# Patient Record
Sex: MALE | Race: WHITE | NOT HISPANIC OR LATINO | Employment: FULL TIME | URBAN - METROPOLITAN AREA
[De-identification: names, ages, dates, MRNs, and addresses within clinical notes are randomized per-mention and may not be internally consistent; named-entity substitution may affect disease eponyms.]

---

## 2024-09-16 ENCOUNTER — OFFICE VISIT (OUTPATIENT)
Dept: URGENT CARE | Facility: URGENT CARE | Age: 34
End: 2024-09-16
Payer: COMMERCIAL

## 2024-09-16 VITALS
DIASTOLIC BLOOD PRESSURE: 95 MMHG | RESPIRATION RATE: 18 BRPM | WEIGHT: 315 LBS | OXYGEN SATURATION: 96 % | HEART RATE: 64 BPM | SYSTOLIC BLOOD PRESSURE: 150 MMHG | TEMPERATURE: 98.4 F

## 2024-09-16 DIAGNOSIS — H10.32 ACUTE BACTERIAL CONJUNCTIVITIS OF LEFT EYE: Primary | ICD-10-CM

## 2024-09-16 PROCEDURE — 99203 OFFICE O/P NEW LOW 30 MIN: CPT

## 2024-09-16 RX ORDER — POLYMYXIN B SULFATE AND TRIMETHOPRIM 1; 10000 MG/ML; [USP'U]/ML
2 SOLUTION OPHTHALMIC EVERY 6 HOURS
Qty: 10 ML | Refills: 0 | Status: SHIPPED | OUTPATIENT
Start: 2024-09-16 | End: 2024-09-23

## 2024-09-16 NOTE — PATIENT INSTRUCTIONS
Use the eyedrops as prescribed and finish the full course even if symptoms improve.  Use warm compress to the eye for your symptoms as well.

## 2024-09-16 NOTE — PROGRESS NOTES
ASSESSMENT:  (H10.32) Acute bacterial conjunctivitis of left eye  (primary encounter diagnosis)  Plan: polymixin b-trimethoprim (POLYTRIM) 61846-9.1         UNIT/ML-% ophthalmic solution    PLAN:  Conjunctivitis patient instructions discussed and provided.  Informed the patient to use the eyedrops as prescribed and finish the full course even if symptoms improve.  We discussed using warm compress to the eye for his symptoms as well.  We also discussed the need to return to clinic with any new or worsening symptoms.  Patient acknowledged his understanding of the above plan.    The use of Dragon/[employer] services may have been used to construct the content in this note; any grammatical or spelling errors are non-intentional. Please contact the author of this note directly if you are in need of any clarification.      ABDIRASHID Bryson CNP    SUBJECTIVE:  Tay Morris is a 33 year old male who presents complaining of left eye redness and watering for 1 day.   Details:  Associated Signs and Syptoms: none  Treatment measures tried include: none  Contact wearer : no    Recent exposure to small children and animals where patient thinks he may have had a risk for infection.     ROS: negative except noted above    OBJECTIVE:  BP (!) 150/95   Pulse 64   Temp 98.4  F (36.9  C) (Tympanic)   Resp 18   Wt (!) 168.3 kg (371 lb)   SpO2 96%   General: no acute distress  Eye exam: left eye: lids normal; PERRL, EOM's intact; mild conjunctival injection; no drainage noted